# Patient Record
Sex: FEMALE | Race: OTHER | URBAN - METROPOLITAN AREA
[De-identification: names, ages, dates, MRNs, and addresses within clinical notes are randomized per-mention and may not be internally consistent; named-entity substitution may affect disease eponyms.]

---

## 2020-12-09 ENCOUNTER — EMERGENCY (EMERGENCY)
Facility: HOSPITAL | Age: 20
LOS: 0 days | Discharge: HOME | End: 2020-12-09
Attending: EMERGENCY MEDICINE
Payer: MEDICAID

## 2020-12-09 VITALS
RESPIRATION RATE: 18 BRPM | WEIGHT: 134.04 LBS | OXYGEN SATURATION: 97 % | SYSTOLIC BLOOD PRESSURE: 119 MMHG | DIASTOLIC BLOOD PRESSURE: 72 MMHG | TEMPERATURE: 98 F | HEIGHT: 62 IN | HEART RATE: 93 BPM

## 2020-12-09 VITALS
DIASTOLIC BLOOD PRESSURE: 71 MMHG | SYSTOLIC BLOOD PRESSURE: 107 MMHG | TEMPERATURE: 98 F | OXYGEN SATURATION: 99 % | HEART RATE: 93 BPM | RESPIRATION RATE: 17 BRPM

## 2020-12-09 DIAGNOSIS — L02.215 CUTANEOUS ABSCESS OF PERINEUM: ICD-10-CM

## 2020-12-09 DIAGNOSIS — Z91.013 ALLERGY TO SEAFOOD: ICD-10-CM

## 2020-12-09 PROCEDURE — 99283 EMERGENCY DEPT VISIT LOW MDM: CPT | Mod: 25

## 2020-12-09 PROCEDURE — 56405 I&D VULVA/PERINEAL ABSCESS: CPT

## 2020-12-09 RX ORDER — AZTREONAM 2 G
1 VIAL (EA) INJECTION
Qty: 14 | Refills: 0
Start: 2020-12-09 | End: 2020-12-15

## 2020-12-09 RX ADMIN — Medication 1 TABLET(S): at 12:45

## 2020-12-09 NOTE — ED PROVIDER NOTE - GENITOURINARY [+], MLM
Swelling of the right perineal area near buttock Swelling and pain of the right perineal area near buttock

## 2020-12-09 NOTE — ED PROVIDER NOTE - CLINICAL SUMMARY MEDICAL DECISION MAKING FREE TEXT BOX
will dc with bactrim, pt denies any pregnancy - abscess on R perineal region drained with packing placed. To return in 2 days for packing removal.

## 2020-12-09 NOTE — ED PROVIDER NOTE - PATIENT PORTAL LINK FT
You can access the FollowMyHealth Patient Portal offered by [facility] by registering at the following website: [email or web address]. By joining [email or web address]’s FollowMyHealth portal, you will also be able to view your health information using other applications (apps) compatible with our system.

## 2020-12-09 NOTE — ED ADULT TRIAGE NOTE - CHIEF COMPLAINT QUOTE
pt c/o vaginal abscess becoming progressively worse and chills started 2 days ago. pt also c/o burning urination. denies hematuria

## 2020-12-09 NOTE — ED PROVIDER NOTE - OBJECTIVE STATEMENT
20 year old female w/no pertinent PMH presents to the ED for right sided perineal/buttock area pain and swelling. Pt states she has never had a lesion like this before. No fever/chills, cough, SOB, CP, abdominal pain or N/V/D. No swelling within or around vagina area, pain is closest to buttock despite what triage note states.

## 2020-12-09 NOTE — ED PROVIDER NOTE - GENITOURINARY, MLM
Performed exam with [employer] student Dorina, there is a 3x4cm areas of induration in the right perineal region, no involvement of vagina or rectum noted, area is close to the right buttock and otherwise is fluctuant, but no surrounding erythema

## 2020-12-11 ENCOUNTER — EMERGENCY (EMERGENCY)
Facility: HOSPITAL | Age: 20
LOS: 0 days | Discharge: HOME | End: 2020-12-11
Payer: MEDICAID

## 2020-12-11 VITALS
RESPIRATION RATE: 18 BRPM | OXYGEN SATURATION: 100 % | SYSTOLIC BLOOD PRESSURE: 118 MMHG | DIASTOLIC BLOOD PRESSURE: 75 MMHG | HEIGHT: 62 IN | WEIGHT: 134.04 LBS | TEMPERATURE: 98 F | HEART RATE: 87 BPM

## 2020-12-11 DIAGNOSIS — Z48.00 ENCOUNTER FOR CHANGE OR REMOVAL OF NONSURGICAL WOUND DRESSING: ICD-10-CM

## 2020-12-11 PROCEDURE — L9995: CPT

## 2020-12-11 NOTE — ED ADULT NURSE NOTE - NSIMPLEMENTINTERV_GEN_ALL_ED
Implemented All Fall with Harm Risk Interventions:  [place] to call system. Call bell, personal items and telephone within reach. Instruct patient to call for assistance. Room bathroom lighting operational. Non-slip footwear when patient is off stretcher. Physically safe environment: no spills, clutter or unnecessary equipment. Stretcher in lowest position, wheels locked, appropriate side rails in place. Provide visual cue, wrist band, yellow gown, etc. Monitor gait and stability. Monitor for mental status changes and reorient to person, place, and time. Review medications for side effects contributing to fall risk. Reinforce activity limits and safety measures with patient and family. Provide visual clues: red socks.

## 2020-12-11 NOTE — ED PROVIDER NOTE - PATIENT PORTAL LINK FT
You can access the FollowMyHealth Patient Portal offered by [facility] by registering at the following website: [email or web address]. By joining [employer]’s FollowMyHealth portal, you will also be able to view your health information using other applications (apps) compatible with our system.

## 2020-12-11 NOTE — ED PROVIDER NOTE - OBJECTIVE STATEMENT
20F here for wound check s/p I&D right perianal abscess. Reports pain is resolved. Denies fever, chills, nausea, vomiting.

## 2020-12-11 NOTE — ED ADULT TRIAGE NOTE - CHIEF COMPLAINT QUOTE
Pt presents for wound check of a perianal abscess. No new complaints. Pt presents for wound check and packing removal of a perianal abscess. No new complaints.

## 2022-09-28 NOTE — ED PROVIDER NOTE - CPE EDP RESP NORM
Called to review persistent HPV + pap smear.     2021 - NEM, HPV +  2022 - NEM, HPV + (16/18 -)    Needs colposcopy.     No answer, left message to call clinic.         Jaky Aguilar MD  [facility] Obstetrics [facility] Gynecology  09/28/2022    
normal...

## 2023-01-25 NOTE — ED ADULT NURSE NOTE - NS ED NOTE  TALK SOMEONE YN
No Mixed Superficial And Nodular Bcc Histology Text: There were aggregates of basaloid cells in a superficial and nodular pattern.

## 2024-01-23 NOTE — ED ADULT NURSE NOTE - SUICIDE SCREENING QUESTION 1
The patient is Watcher - Medium risk of patient condition declining or worsening    Shift Goals  Clinical Goals: Monitor SPO2, manage secretions  Patient Goals: Pain control  Family Goals: PAULA    Progress made toward(s) clinical / shift goals:    Problem: Knowledge Deficit - Standard  Goal: Patient and family/care givers will demonstrate understanding of plan of care, disease process/condition, diagnostic tests and medications  Outcome: Progressing     Problem: Hemodynamics  Goal: Patient's hemodynamics, fluid balance and neurologic status will be stable or improve  Outcome: Progressing     Problem: Pain - Standard  Goal: Alleviation of pain or a reduction in pain to the patient’s comfort goal  Outcome: Progressing       Patient is not progressing towards the following goals:       No